# Patient Record
Sex: FEMALE | Race: BLACK OR AFRICAN AMERICAN
[De-identification: names, ages, dates, MRNs, and addresses within clinical notes are randomized per-mention and may not be internally consistent; named-entity substitution may affect disease eponyms.]

---

## 2018-08-16 PROBLEM — Z00.00 ENCOUNTER FOR PREVENTIVE HEALTH EXAMINATION: Status: ACTIVE | Noted: 2018-08-16

## 2018-08-30 ENCOUNTER — APPOINTMENT (OUTPATIENT)
Dept: OTOLARYNGOLOGY | Facility: CLINIC | Age: 43
End: 2018-08-30
Payer: MEDICAID

## 2018-08-30 VITALS
DIASTOLIC BLOOD PRESSURE: 79 MMHG | HEART RATE: 79 BPM | BODY MASS INDEX: 39.27 KG/M2 | WEIGHT: 230 LBS | HEIGHT: 64 IN | SYSTOLIC BLOOD PRESSURE: 113 MMHG

## 2018-08-30 DIAGNOSIS — J34.1 CYST AND MUCOCELE OF NOSE AND NASAL SINUS: ICD-10-CM

## 2018-08-30 DIAGNOSIS — F32.9 ANXIETY DISORDER, UNSPECIFIED: ICD-10-CM

## 2018-08-30 DIAGNOSIS — Z56.0 UNEMPLOYMENT, UNSPECIFIED: ICD-10-CM

## 2018-08-30 DIAGNOSIS — F41.9 ANXIETY DISORDER, UNSPECIFIED: ICD-10-CM

## 2018-08-30 DIAGNOSIS — J30.9 ALLERGIC RHINITIS, UNSPECIFIED: ICD-10-CM

## 2018-08-30 DIAGNOSIS — J34.3 HYPERTROPHY OF NASAL TURBINATES: ICD-10-CM

## 2018-08-30 DIAGNOSIS — Z83.3 FAMILY HISTORY OF DIABETES MELLITUS: ICD-10-CM

## 2018-08-30 DIAGNOSIS — Z82.49 FAMILY HISTORY OF ISCHEMIC HEART DISEASE AND OTHER DISEASES OF THE CIRCULATORY SYSTEM: ICD-10-CM

## 2018-08-30 DIAGNOSIS — Z83.42 FAMILY HISTORY OF FAMILIAL HYPERCHOLESTEROLEMIA: ICD-10-CM

## 2018-08-30 PROCEDURE — 31231 NASAL ENDOSCOPY DX: CPT

## 2018-08-30 PROCEDURE — 99203 OFFICE O/P NEW LOW 30 MIN: CPT | Mod: 25

## 2018-08-30 RX ORDER — FLUTICASONE PROPIONATE 50 UG/1
50 SPRAY, METERED NASAL DAILY
Qty: 1 | Refills: 3 | Status: ACTIVE | COMMUNITY
Start: 2018-08-30 | End: 1900-01-01

## 2018-08-30 SDOH — ECONOMIC STABILITY - INCOME SECURITY: UNEMPLOYMENT, UNSPECIFIED: Z56.0

## 2018-09-05 ENCOUNTER — RX CHANGE (OUTPATIENT)
Age: 43
End: 2018-09-05

## 2018-09-05 RX ORDER — CETIRIZINE HYDROCHLORIDE 10 MG/1
10 TABLET, COATED ORAL DAILY
Qty: 1 | Refills: 5 | Status: ACTIVE | COMMUNITY
Start: 2018-09-05 | End: 1900-01-01

## 2018-09-05 RX ORDER — LEVOCETIRIZINE DIHYDROCHLORIDE 5 MG/1
5 TABLET ORAL DAILY
Qty: 30 | Refills: 5 | Status: ACTIVE | COMMUNITY
Start: 2018-08-30

## 2018-09-10 PROBLEM — Z82.49 FAMILY HISTORY OF HYPERTENSION: Status: ACTIVE | Noted: 2018-09-10

## 2018-09-10 PROBLEM — Z83.42 FAMILY HISTORY OF HYPERCHOLESTEROLEMIA: Status: ACTIVE | Noted: 2018-09-10

## 2018-09-10 PROBLEM — Z83.3 FAMILY HISTORY OF DIABETES MELLITUS: Status: ACTIVE | Noted: 2018-09-10

## 2018-09-13 PROBLEM — Z56.0 CURRENTLY UNEMPLOYED AND NOT LOOKING FOR WORK: Status: ACTIVE | Noted: 2018-09-13

## 2018-09-13 PROBLEM — J34.1 MAXILLARY SINUS CYST: Status: ACTIVE | Noted: 2018-09-13

## 2018-09-13 PROBLEM — F41.9 ANXIETY AND DEPRESSION: Status: RESOLVED | Noted: 2018-09-13 | Resolved: 2018-09-13

## 2018-09-13 NOTE — HISTORY OF PRESENT ILLNESS
[de-identified] : I was pleased to evaluate this 43 year old woman who was referred by Dr. Simmons for a sinus cyst.\par \par Ms. Rosario had MRI brain to evaluate frequent headaches.  A right maxillary cyst was noted, per patient report.\par She reports that she has frequent nasal congestion, nasal itching and watery eyes related to allergies.\par She has no facial pain/pressure, rhinorrhea or postnasal drip on regular basis.\par No treatment needed for sinus infections.\par She has constant feeling of vertigo.  No hearing loss, ear pain or tinnitus but has frequent bilateral ear popping.\par \par \par MRI BRAIN and IACs with contrast (12/21/2017) at [facility]:\par - Brain and IACS normal\par - Paranasal sinuses are clear with no fluid or mucosal thickening\par (no images available for review)\par \par \par MEDICATIONS\par None\par

## 2018-09-13 NOTE — PROCEDURE
[FreeTextEntry6] : \par Indication:  maxillary cyst\par -Verbal consent was obtained from patient prior to exam. \par - Song-Synephrine and lidocaine 2% spray applied to nose bilaterally.\par Nasal endoscopy was performed with flexible scope.\par Findings: \par -- Inferior turbinates edematous and boggy  bilateral.  Inferior meatus clear bilateral.\par -- Septum was  S-shaped.\par -- No polyps either side nose\par -- Mucus clear bilateral\par -- Middle and superior turbinates normal bilateral\par -- Middle meatus clear bilateral.  SER clear bilateral.\par -- Nasopharynx without mass or exudate\par -- Adenoids were small\par -- Eustachian orifices were clear bilateral\par \par The patient tolerated the procedure well.\par \par

## 2018-09-13 NOTE — ASSESSMENT
[FreeTextEntry1] : Ms. Rosario has the following issues:\par \par 1.) Allergic rhinitis with inferior turbinate hypertrophy\par 2.) Right maxillary cyst by pt report but not mentioned on the MRI report.  I do not have images for review.\par She has no sinus symptoms\par \par PLAN:\par - pt to get MRI images for my review\par - fluticasone nasal spray\par - antihistamine\par \par Return in a few weeks

## 2018-09-13 NOTE — CONSULT LETTER
[Dear  ___] : Dear  [unfilled], [Consult Letter:] : I had the pleasure of evaluating your patient, [unfilled]. [Consult Closing:] : Thank you very much for allowing me to participate in the care of this patient.  If you have any questions, please do not hesitate to contact me. [Sincerely,] : Sincerely, [DrKevin  ___] : Dr. PROCTOR [FreeTextEntry2] : Gian Simmons M.D.\[address]\[place] [id number] [FreeTextEntry1] : \par \par Enclosed please find my office notes for August 30, 2018.\par  [FreeTextEntry3] : \par Rebeca Davila MD \par Otolaryngology, Head and Neck Surgery \par Residency site , [facility]\par

## 2018-09-13 NOTE — PHYSICAL EXAM
[Nasal Endoscopy Performed] : nasal endoscopy was performed, see procedure section for findings [Midline] : trachea located in midline position [Normal] : no rashes

## 2018-09-13 NOTE — REVIEW OF SYSTEMS
[Patient Intake Form Reviewed] : Patient intake form was reviewed [As Noted in HPI] : as noted in HPI [Feeling Tired] : feeling tired [Eyesight Problems] : eyesight problems [Dry Eyes] : dry eyes [Eyes Itch] : itching of the eyes [Shortness Of Breath] : shortness of breath [Constipation] : constipation [Joint Pain] : joint pain [Joint Stiffness] : joint stiffness [Anxiety] : anxiety [Depression] : depression [Negative] : Heme/Lymph [FreeTextEntry2] : daytime sleepiness [FreeTextEntry6] : chest congestion [FreeTextEntry7] : nausea [de-identified] : numbness, tingling, tremors, imbalance, memory loss [de-identified] : cold intolerance